# Patient Record
(demographics unavailable — no encounter records)

---

## 2025-02-14 NOTE — IMAGING
[de-identified] : (Exam: Shoulder)   Laterality is right    Patient is in no acute distress, alert and oriented Sensation is grossly intact to light touch in the hand Motor function is 5/5 in the hand Capillary refill is less than 2 seconds in the fingers Lymphadenopathy is not present Peripheral edema is not present   Skin is intact Swelling is not present Atrophy is not present Scapular winging is not present Deformity of the AC joint is not present Deformity of the biceps is not present   Bicipital groove tenderness is present AC joint tenderness is not present Scapulothoracic tenderness is not present   Active forward elevation is 100 Passive forward elevation is 140 External rotation at the side is 20 Internal rotation behind the back is to the level of back pocket   Forward elevation strength is 4/5 External rotation strength at the side is 5/5 Internal rotation strength at the side is 5/5 Deltoid strength of anterior, posterior and lateral heads is 5/5   Cm test is abnormal OBriens test is abnormal Empty can test is abnormal Speeds test is abnormal Cross body adduction test is normal Belly press test is normal Apprehension and relocation is normal Sulcus sign is normal [Right] : right shoulder [FreeTextEntry1] : moderate GH OA

## 2025-02-14 NOTE — HISTORY OF PRESENT ILLNESS
[de-identified] : Date of initial evaluation: 02/14/2025 Patient age: 84 year Body part causing symptoms: right shoulder Location of the pain: anterior Pain score today: 8/10 Duration of symptoms: 3 years History of injury: none Activities that worsen the pain: Rasing arm up Radicular symptoms: none Medications attempted for this problem: none PT for this problem: none Injections for this problem: CSI 2 years ago Previous surgery on this body part: none Prior imaging: none Occupation: retired

## 2025-02-14 NOTE — DISCUSSION/SUMMARY
[de-identified] : (Impression) -right shoulder glenohumeral osteoarthritis    The diagnosis was explained in detail. The potential non-surgical and surgical treatments were reviewed. The relative risks and benefits of each option were considered relative to the patient age, activity level, medical history, symptom severity and previously attempted treatments.  The patient was advised to consult with their primary medical provider prior to initiation of any new medications to reduce the risk of adverse effects specific to their long-term home medications and medical history. The risk of gastrointestinal irritation and kidney injury specific to long-term NSAID use was discussed.    (Plan)  -Physical therapy recommended for stretching and strengthening. -Cortisone injection performed for symptom relief. -Over the counter NSAID for pain and inflammation, take as needed. -MRI is deferred at this time. -Discussed the only definitive treatment for this problem is arthroplasty. -Follow up in 3 months, if symptoms persist consider repeat injection versus surgical options.     (MDM) Problem Complexity -Moderate: chronic illness with exacerbation   Risk -Moderate: injection   Visit Type -Established     (Procedure: Corticosteroid Injection)   Injection location was the: -right glenohumeral joint   Injection contents were: 40 mg of kenalog and 5 mL of 1% lidocaine   Procedure Details: -Informed consent was obtained. Discussed possible risks of corticosteroid injection including hyperglycemia, infection and skin discoloration. -Discussed that due to infection risk, an interval between injection and any future surgery is 6 weeks for arthroscopy and 3 months for arthroplasty. -Injection performed using aseptic technique. Hemostasis was confirmed. -Procedure was tolerated well with no complications.

## 2025-05-16 NOTE — HISTORY OF PRESENT ILLNESS
[de-identified] : 05/16/2025: f/u for right shoulder.  CSI performed on 02/14/2025 with relief until he fell 1 month ago. not taking anything for pain.    Date of initial evaluation: 02/14/2025 Patient age: 84 year Body part causing symptoms: right shoulder Location of the pain: anterior Pain score today: 8/10 Duration of symptoms: 3 years History of injury: none Activities that worsen the pain: Rasing arm up Radicular symptoms: none Medications attempted for this problem: none PT for this problem: none Injections for this problem: CSI 2 years ago Previous surgery on this body part: none Prior imaging: none Occupation: retired

## 2025-05-16 NOTE — DISCUSSION/SUMMARY
[de-identified] : (Impression) -right shoulder glenohumeral osteoarthritis    The diagnosis was explained in detail. The potential non-surgical and surgical treatments were reviewed. The relative risks and benefits of each option were considered relative to the patient age, activity level, medical history, symptom severity and previously attempted treatments.  The patient was advised to consult with their primary medical provider prior to initiation of any new medications to reduce the risk of adverse effects specific to their long-term home medications and medical history. The risk of gastrointestinal irritation and kidney injury specific to long-term NSAID use was discussed.    (Plan)  -Cortisone injection performed for symptom relief. -Over the counter NSAID for pain and inflammation, take as needed. -Patient defers surgical options at this time. Discussed he is candidate for RSA.  -Follow up in 3 months, if symptoms persist consider repeat injection versus surgical options.     (MDM) Problem Complexity -Moderate: chronic illness with exacerbation   Risk -Moderate: injection   Visit Type -Established     (Procedure: Corticosteroid Injection)   Injection location was the: -right glenohumeral joint   Injection contents were: 40 mg of kenalog and 5 mL of 1% lidocaine   Procedure Details: -Informed consent was obtained. Discussed possible risks of corticosteroid injection including hyperglycemia, infection and skin discoloration. -Discussed that due to infection risk, an interval between injection and any future surgery is 6 weeks for arthroscopy and 3 months for arthroplasty. -Injection performed using aseptic technique. Hemostasis was confirmed. -Procedure was tolerated well with no complications.

## 2025-05-16 NOTE — IMAGING
[Right] : right shoulder [de-identified] : (Exam: Shoulder)   Laterality is right    Patient is in no acute distress, alert and oriented Sensation is grossly intact to light touch in the hand Motor function is 5/5 in the hand Capillary refill is less than 2 seconds in the fingers Lymphadenopathy is not present Peripheral edema is not present   Skin is intact Swelling is not present Atrophy is not present Scapular winging is not present Deformity of the AC joint is not present Deformity of the biceps is not present   Bicipital groove tenderness is present AC joint tenderness is not present Scapulothoracic tenderness is not present   Active forward elevation is 100 Passive forward elevation is 140 External rotation at the side is 20 Internal rotation behind the back is to the level of back pocket   Forward elevation strength is 4/5 External rotation strength at the side is 5/5 Internal rotation strength at the side is 5/5 Deltoid strength of anterior, posterior and lateral heads is 5/5   Cm test is abnormal OBriens test is abnormal Empty can test is abnormal Speeds test is abnormal Cross body adduction test is normal Belly press test is normal Apprehension and relocation is normal Sulcus sign is normal [FreeTextEntry1] : moderate GH OA